# Patient Record
Sex: FEMALE | Race: WHITE | Employment: STUDENT | URBAN - METROPOLITAN AREA
[De-identification: names, ages, dates, MRNs, and addresses within clinical notes are randomized per-mention and may not be internally consistent; named-entity substitution may affect disease eponyms.]

---

## 2023-05-23 ENCOUNTER — TELEPHONE (OUTPATIENT)
Dept: PEDIATRICS | Facility: CLINIC | Age: 23
End: 2023-05-23
Payer: COMMERCIAL

## 2023-05-23 NOTE — TELEPHONE ENCOUNTER
----- Message from Sahra Guerin sent at 5/23/2023  4:12 PM CDT -----  Contact: Raeann  Pt is calliing to speak with then nurse regarding appt. Pt reports being 36 weeks pregnant and wanting to establish a pediatrician. Please give pt a call back at .[phone]. Thanks KD.    Left message telling pt to call us back in regards to setting up a meet and greet with Dr. Ken for her baby.